# Patient Record
Sex: FEMALE | Race: WHITE | URBAN - METROPOLITAN AREA
[De-identification: names, ages, dates, MRNs, and addresses within clinical notes are randomized per-mention and may not be internally consistent; named-entity substitution may affect disease eponyms.]

---

## 2017-12-25 ENCOUNTER — NURSE TRIAGE (OUTPATIENT)
Dept: ADMINISTRATIVE | Facility: CLINIC | Age: 82
End: 2017-12-25

## 2017-12-25 NOTE — TELEPHONE ENCOUNTER
Take Valsartan 160 mg daily in the morning. Also takes hydralazine 10 mg in morning and at night. 2 hours ago took an extra Valsartan. 156/74 was b/p.    Reason for Disposition   Triager unable to answer question    Protocols used: ST POISONING-A-